# Patient Record
Sex: FEMALE | URBAN - METROPOLITAN AREA
[De-identification: names, ages, dates, MRNs, and addresses within clinical notes are randomized per-mention and may not be internally consistent; named-entity substitution may affect disease eponyms.]

---

## 2023-12-05 ENCOUNTER — OFFICE VISIT (OUTPATIENT)
Dept: PLASTIC SURGERY | Facility: CLINIC | Age: 53
End: 2023-12-05

## 2023-12-05 DIAGNOSIS — Z41.1 ENCOUNTER FOR COSMETIC PROCEDURE: Primary | ICD-10-CM

## 2023-12-05 RX ORDER — DROSPIRENONE AND ETHINYL ESTRADIOL 0.03MG-3MG
1 KIT ORAL DAILY
COMMUNITY

## 2023-12-05 NOTE — PROGRESS NOTES
Facial Plastic and Reconstructive Surgery Cosmetic Consultation  12/05/23     Referring Provider:   Jonny Colin MD  [facility]  [address]  [place] [address]    HPI:   I had the pleasure of seeing Suzette Dinero today in clinic for consultation for surgical facial rejuvenation.    Suzette Dinero is a 53 year old female.  She reports that ever since fourth grade, she has been self-conscious about her small chin.  She is gotten filler placed multiple times, most recently about 1 year ago.  She thinks it was maybe Voluma.  She is interested in a more permanent solution.  She is also lost some weight recently and notices some excess neck skin and jowling, she is not ready for surgery for that yet.    Allergies: PCN  Medications: Olella (birth control)  Surgical hx: appendectomy 1988  Smoker: no      PE:  Alert and Oriented, Answering Questions Appropriately  Atraumatic, Normocephalic, Face Symmetric  Skin: Anthony 2  Facial Nerve Intact and facial movement symmetric  She has mid facial descent and early jowling.  She has some skin laxity and blunting of her cervicomental angle.  She has a weak chin.            IMPRESSION/PLAN: Suzette Dinero is a 53 year old female here today in consult for surgical facial rejuvenation.  Specifically, she would like to add projection to her chin with an implant.  She has had fillers in the past.  We discussed adding a medium or large chin implant for her.  I would do this through a submental incision.  We would not do any liposuction or skin tightening at that time.  I was candid with her that her jowling and cervicomental angle and skin laxity would not change.  She is okay with this that she is not ready for a face or neck lift.  I would like her to have her filler dissolved 1 month before the procedure.  I would then like her to come in prior to the operation for updated pictures.  This will help us decide on the appropriate size of  implant.  She has a penicillin allergy.    Photodocumentation was obtained.     Risks and benefits of the procedure were discussed, including but not limited to motor/sensory nerve damage (temporary and permanent), scarring, hematoma, irregularities of skin and underlying soft tissue, infection, asymmetry, and the need for additional procedures.

## 2023-12-05 NOTE — Clinical Note
December 5, 2023  Re: Suzette Dinero  1970    Dear Dr. Colin,    Thank you so much for referring Suzette Dinero to the [facility]. I had the pleasure of visiting with Suzette today.     Attached you will find a copy of my note. Please feel free to reach out to me with any questions, [phone].     Facial Plastic and Reconstructive Surgery Cosmetic Consultation  12/05/23     Referring Provider:   [facility]  [address]  [place] [address]    HPI:   I had the pleasure of seeing Suzette Dinero today in clinic for consultation for surgical facial rejuvenation.    Suzette Dinero is a 53 year old female. ***      PE:  Alert and Oriented, Answering Questions Appropriately  Atraumatic, Normocephalic, Face Symmetric  Skin: Anthony ***  Facial Nerve Intact and facial movement symmetric            IMPRESSION/PLAN: Suzette Dinero is a 53 year old female here today in consult for surgical facial rejuvenation.     Photodocumentation was obtained.     Risks and benefits of the procedure were discussed, including but not limited to motor/sensory nerve damage (temporary and permanent), scarring, hematoma, irregularities of skin and underlying soft tissue, infection, asymmetry, and the need for additional procedures.                   Your trust in our practice and care is much appreciated.    Sincerely,  Jessica Erwin MD

## 2023-12-05 NOTE — NURSING NOTE
2D/3D photodocumentation obtained.    Gave pt a cosmetic quote/GFE for Chin Augmentation with Chin Implant (see Media tab).    Discussed quote in great detail with pt, including the fact that the anesthesia and facility fees are an estimate based on time and may be subject to change. We also discussed the fact that the surgeon's fee is due three weeks prior to surgery.    Pt verbalized understanding of financial responsibility if she decides to pursue the cosmetic surgery.    Pt given educational materials on chin augmentation, as well as a list of medications to avoid prior to surgery and what to expect post-op.    Pt to follow up the week or two prior to surgery for updated photodocumentation after chin filler has been dissolved.    Mary Ann Sosa RN  12/5/2023 1:57 PM

## 2023-12-05 NOTE — Clinical Note
12/5/2023       RE: Suzette Dinero  [address]  [facility] [address]     Dear Colleague,    Thank you for referring your patient, Suzette Dinero, to the [facility] at [facility]. Please see a copy of my visit note below.    Facial Plastic and Reconstructive Surgery Cosmetic Consultation  12/05/23     Referring Provider:   [facility]  [address]  [place] [address]    HPI:   I had the pleasure of seeing Suzette Dinero today in clinic for consultation for surgical facial rejuvenation.    Suzette Dinero is a 53 year old female. ***      PE:  Alert and Oriented, Answering Questions Appropriately  Atraumatic, Normocephalic, Face Symmetric  Skin: Anthony ***  Facial Nerve Intact and facial movement symmetric            IMPRESSION/PLAN: Suzette Dinero is a 53 year old female here today in consult for surgical facial rejuvenation.     Photodocumentation was obtained.     Risks and benefits of the procedure were discussed, including but not limited to motor/sensory nerve damage (temporary and permanent), scarring, hematoma, irregularities of skin and underlying soft tissue, infection, asymmetry, and the need for additional procedures.                 Again, thank you for allowing me to participate in the care of your patient.      Sincerely,    Jessica Erwin MD

## 2024-01-30 ENCOUNTER — OFFICE VISIT (OUTPATIENT)
Dept: PLASTIC SURGERY | Facility: CLINIC | Age: 54
End: 2024-01-30
Payer: COMMERCIAL

## 2024-01-30 DIAGNOSIS — Z41.1 ENCOUNTER FOR COSMETIC PROCEDURE: Primary | ICD-10-CM

## 2024-01-30 NOTE — PROGRESS NOTES
Facial Plastic and Reconstructive Surgery    Procedure: Dermal Filler for Facial Contouring  Indication: Facial volume deficit  Injector: Dr. Jessica Erwin MD      The risks and benefits associated with dermal filler were discussed. Informed consent was obtained specifically addressing risks including intravascular injection. The skin was cleaned with antimicrobial solution and a topical ice was placed. If the patient elected topical anesthetic solution, this was placed.     A needle was used to establish the entry point of the canula. The canula and needle were used to inject the filler with slow cautious consistent flow with careful attention to the danger zones of the face.      Intermittent ice was used topically throughout the procedure. Hemostasis was obtained at the needle entry site with light digital pressure. The skin was cleaned.    Filler Type: Hylenex  Total Amount of Filler: 1cc    Dissolving chin  anticipation of chin implant.     Please see procedure log.    There were no complications and the patient tolerated the procedure well.  Post procedure care instructions were given to the patient.

## 2024-02-08 NOTE — PROGRESS NOTES
Obtained signed informed consent for hylenex injections (see media tab).    Amara Armenta RN  2/8/2024 1:50 PM

## 2024-02-27 ENCOUNTER — ALLIED HEALTH/NURSE VISIT (OUTPATIENT)
Dept: PLASTIC SURGERY | Facility: CLINIC | Age: 54
End: 2024-02-27
Payer: COMMERCIAL

## 2024-02-27 DIAGNOSIS — Z98.890 POSTOPERATIVE STATE: Primary | ICD-10-CM

## 2024-02-27 NOTE — PROGRESS NOTES
Updated photodocumentation obtained post hylenex to chin filler (see media tab).    Amara Armenta RN  2/27/2024 3:31 PM

## 2024-03-05 DIAGNOSIS — Z98.890 POSTOPERATIVE STATE: Primary | ICD-10-CM

## 2024-03-05 RX ORDER — DOXYCYCLINE 100 MG/1
100 CAPSULE ORAL 2 TIMES DAILY
Qty: 14 CAPSULE | Refills: 0 | Status: SHIPPED | OUTPATIENT
Start: 2024-03-05 | End: 2024-03-12

## 2024-03-05 RX ORDER — TRAMADOL HYDROCHLORIDE 50 MG/1
50 TABLET ORAL EVERY 6 HOURS PRN
Qty: 8 TABLET | Refills: 0 | Status: SHIPPED | OUTPATIENT
Start: 2024-03-05 | End: 2024-03-08

## 2024-03-05 RX ORDER — ONDANSETRON 4 MG/1
4 TABLET, ORALLY DISINTEGRATING ORAL EVERY 6 HOURS PRN
Qty: 12 TABLET | Refills: 0 | Status: SHIPPED | OUTPATIENT
Start: 2024-03-05

## 2024-03-06 ENCOUNTER — TRANSFERRED RECORDS (OUTPATIENT)
Dept: HEALTH INFORMATION MANAGEMENT | Facility: CLINIC | Age: 54
End: 2024-03-06

## 2024-03-13 ENCOUNTER — OFFICE VISIT (OUTPATIENT)
Dept: PLASTIC SURGERY | Facility: CLINIC | Age: 54
End: 2024-03-13
Payer: COMMERCIAL

## 2024-03-13 DIAGNOSIS — Z98.890 POSTOPERATIVE STATE: Primary | ICD-10-CM

## 2024-03-13 NOTE — PROGRESS NOTES
Shorty Bradford s/p Chin Augmentation (3/6/2024).    She reports she is doing well, she is expectedly bruised and edematous. Sutures removed from incision, which is well approximated. She has been diligent with her post op cares. We discussed that her result will become more evident with time, as swelling dissipates.     We discussed continued cares not limited to continued cleansing with 1:1 hydrogen peroxide and water, frequent application of Aquaphor, sun protection and the future use of silicone scar gel and gentle massage. Pt verbalized understanding of this. She purchased scar gel today.    All questions answered at this time. Pt instructed to reach out if questions or concerns arise.    Follow-up appt made.    Amara Armenta RN  3/13/2024 3:57 PM

## 2024-04-09 ENCOUNTER — OFFICE VISIT (OUTPATIENT)
Dept: PLASTIC SURGERY | Facility: CLINIC | Age: 54
End: 2024-04-09
Payer: COMMERCIAL

## 2024-04-09 DIAGNOSIS — Z41.1 ENCOUNTER FOR COSMETIC PROCEDURE: Primary | ICD-10-CM

## 2024-04-09 NOTE — PROGRESS NOTES
Facial Plastic and Reconstructive Surgery      Suzette Dinero is s/p Chin Augmentation on 3/6/2024. Medium chin implant placed. Today, she reports she is doing well. She can definitely feel the implant and she is getting used to that. No concerns.     On exam, things are healing well. I can feel the implant and it is in good position. She has a nice improvement in her projection. She still has some expected edema. The incision is erythematous and firm, as expected at this point.     A/P: She is now about 1 month s/p chin augmentation with medium chin implant. She is doing well. She is getting  in September in [place], [place].   I would like to see her back in 3-4 months, sooner if there are issues.     Jessica Erwin MD

## 2024-07-23 ENCOUNTER — OFFICE VISIT (OUTPATIENT)
Dept: PLASTIC SURGERY | Facility: CLINIC | Age: 54
End: 2024-07-23
Payer: COMMERCIAL

## 2024-07-23 DIAGNOSIS — Z98.890 POSTOPERATIVE STATE: Primary | ICD-10-CM

## 2024-07-23 NOTE — PROGRESS NOTES
Facial Plastic and Reconstructive Surgery        Suzette Dinero is s/p Chin Augmentation on 3/6/2024. Medium chin implant placed. Today, she reports she has no concerns and is happy with how things are looking. Her chin still feels different to her. The incision does not bother her.      On exam, she looks great! She has nice projection from the implant. Her submental scar is hard to even see.      A/P: She is now about 4 months s/p chin augmentation with medium chin implant. She is doing well. She is getting  in September in [place], [place].   She can follow up with me as needed, certainly if she has any issues at all.      Jessica Erwin MD